# Patient Record
Sex: FEMALE | Race: WHITE
[De-identification: names, ages, dates, MRNs, and addresses within clinical notes are randomized per-mention and may not be internally consistent; named-entity substitution may affect disease eponyms.]

---

## 2019-02-08 ENCOUNTER — HOSPITAL ENCOUNTER (OUTPATIENT)
Dept: HOSPITAL 62 - OD | Age: 39
End: 2019-02-08
Attending: NURSE PRACTITIONER
Payer: COMMERCIAL

## 2019-02-08 DIAGNOSIS — M47.892: ICD-10-CM

## 2019-02-08 DIAGNOSIS — M54.2: Primary | ICD-10-CM

## 2019-02-08 PROCEDURE — 72050 X-RAY EXAM NECK SPINE 4/5VWS: CPT

## 2019-02-08 NOTE — RADIOLOGY REPORT (SQ)
EXAM DESCRIPTION:  C SP 4 OR 5 VIEWS



COMPLETED DATE/TIME:  2/8/2019 12:34 pm



REASON FOR STUDY:  NECK PAIN M54.2  CERVICALGIA



COMPARISON:  None.



NUMBER OF VIEWS:  Five views.



TECHNIQUE:  AP, lateral, obliques and odontoid radiographic images acquired of the cervical spine.



LIMITATIONS:  None.



FINDINGS:  MINERALIZATION: Normal.

ALIGNMENT:  Loss of the normal lordotic curvature likely secondary to muscle spasm.

VERTEBRAE: Vertebral bodies of normal height.

DISCS:  Anterior osteophytes C5 and C6.  Disc height maintained.

FORAMINA: No osteophytes or foraminal narrowing.

LATERAL AND POSTERIOR ELEMENTS: Facets, lateral masses and spinous processes without significant find
ings.

HARDWARE: None in the spine.

SOFT TISSUES: No masses or calcifications. Lung apices clear.

OTHER: No other significant finding.



IMPRESSION:  1.  Loss of the normal lordotic curvature likely secondary to muscle spasm.

2.  Mild cervical spondylosis C5-C6.

3. No acute osseous findings.



TECHNICAL DOCUMENTATION:  JOB ID:  [id number]

 2011 [employer]- All Rights Reserved



Reading location - IP/workstation name: [email or web address]